# Patient Record
Sex: FEMALE | Race: BLACK OR AFRICAN AMERICAN | Employment: UNEMPLOYED | URBAN - NONMETROPOLITAN AREA
[De-identification: names, ages, dates, MRNs, and addresses within clinical notes are randomized per-mention and may not be internally consistent; named-entity substitution may affect disease eponyms.]

---
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
## 2024-01-10 ENCOUNTER — HOSPITAL ENCOUNTER (EMERGENCY)
Age: 19
Discharge: HOME | End: 2024-01-10
Payer: COMMERCIAL

## 2024-01-10 VITALS
RESPIRATION RATE: 16 BRPM | WEIGHT: 240 LBS | HEART RATE: 100 BPM | BODY MASS INDEX: 36.37 KG/M2 | DIASTOLIC BLOOD PRESSURE: 90 MMHG | OXYGEN SATURATION: 100 % | HEIGHT: 68 IN | TEMPERATURE: 99.5 F | SYSTOLIC BLOOD PRESSURE: 144 MMHG

## 2024-01-10 DIAGNOSIS — U07.1 COVID-19: Primary | ICD-10-CM

## 2024-01-10 LAB
FLUAV RNA RESP QL NAA+PROBE: NOT DETECTED
FLUBV RNA RESP QL NAA+PROBE: NOT DETECTED
SARS-COV-2 RNA RESP QL NAA+PROBE: DETECTED

## 2024-01-10 PROCEDURE — 99283 EMERGENCY DEPT VISIT LOW MDM: CPT

## 2024-01-10 PROCEDURE — 87636 SARSCOV2 & INF A&B AMP PRB: CPT

## 2024-01-11 NOTE — ED TRIAGE NOTES
Patient presents to ED with chief complaint of needing covid test. Patient states she took one at home and it was positive and she would like us to do one as well to make sure.

## 2024-01-11 NOTE — DISCHARGE INSTRUCTIONS
You need to increase the amount of fluids you are taking in to account for the viral illness.  The expected duration of illness is 7-10 days.  Tylenol 650 mg every 6 hours for fever and body aches.  Motrin 600 mg every six hours for fever and body aches.  You can use over the counter robitussin for the cough.  Follow up with your primary care provider if symptoms worsen over the next 3 to 5 days.     Recommend taking Vitamin C 500 mg twice daily, zinc  mg daily (for no more than one month), Vitamin D3 1000 units and slow release melatonin to help with the symptoms.     Check your pulse ox 4-6 times a day.  Return if less than 90%

## 2024-01-11 NOTE — ED PROVIDER NOTES
COVID-19 & INFLUENZA COMBO - Abnormal; Notable for the following components:       Result Value    SARS-CoV-2 RNA, RT PCR DETECTED (*)     All other components within normal limits       (Any cultures that may have been sent were not resulted at the time of this patient visit)    MEDICAL DECISION MAKING / ED COURSE:     Summary of Patient Presentation:      1) Number and Complexity of Problems                    Differential Diagnosis includes (but not limited to):  COVID, influenza A/B, strep pharyngitis, mononucleosis.                   Pertinent Comorbid Conditions:    Reviewed per the chart    2)  Data Reviewed (none if left blank, additional information can be found in the ED course)          My Independent interpretations:     EKG:           Imaging:      Labs:      Positive COVID                 Decision Rules/Clinical Scores utilized:                        Previous visit summary and patient history available on EMR and was reviewed.     History obtained from chart review and the patient.     Pertinent previous records reviewed:  previous notes, admissions and hospitalizations .    Code Status: Not addressed at time of initial evaluation and reviewed.             See Formal Diagnostic Results above for the lab and radiology tests and orders.         3)  Treatment and Disposition         ED Reassessment:            Case discussed with consulting clinician/attending physician:            Shared Decision-Making was performed and disposition discussed with the       Patient/Family and questions answered          Social determinants of health impacting treatment or disposition:                     Medical Decision Making    Vitals Reviewed:    Vitals:    01/10/24 1922   BP: (!) 144/90   Pulse: 100   Resp: 16   Temp: 99.5 °F (37.5 °C)   TempSrc: Oral   SpO2: 100%   Weight: 108.9 kg (240 lb)   Height: 1.727 m (5' 8\")       The patient was seen and examined. Appropriate diagnostic testing was performed and results

## 2024-01-27 ENCOUNTER — HOSPITAL ENCOUNTER (EMERGENCY)
Age: 19
Discharge: HOME | End: 2024-01-27
Attending: EMERGENCY MEDICINE
Payer: COMMERCIAL

## 2024-01-27 VITALS
HEART RATE: 97 BPM | SYSTOLIC BLOOD PRESSURE: 142 MMHG | RESPIRATION RATE: 16 BRPM | DIASTOLIC BLOOD PRESSURE: 86 MMHG | OXYGEN SATURATION: 99 % | TEMPERATURE: 98.7 F

## 2024-01-27 DIAGNOSIS — J02.9 VIRAL PHARYNGITIS: Primary | ICD-10-CM

## 2024-01-27 LAB
FLUAV RNA RESP QL NAA+PROBE: NOT DETECTED
FLUBV RNA RESP QL NAA+PROBE: NOT DETECTED
S PYO AG THROAT QL: NEGATIVE
S PYO THROAT QL CULT: NORMAL
SARS-COV-2 RNA RESP QL NAA+PROBE: NOT DETECTED

## 2024-01-27 PROCEDURE — 99283 EMERGENCY DEPT VISIT LOW MDM: CPT

## 2024-01-27 PROCEDURE — 94640 AIRWAY INHALATION TREATMENT: CPT

## 2024-01-27 PROCEDURE — 6370000000 HC RX 637 (ALT 250 FOR IP)

## 2024-01-27 PROCEDURE — 94761 N-INVAS EAR/PLS OXIMETRY MLT: CPT

## 2024-01-27 PROCEDURE — 87070 CULTURE OTHR SPECIMN AEROBIC: CPT

## 2024-01-27 PROCEDURE — 87636 SARSCOV2 & INF A&B AMP PRB: CPT

## 2024-01-27 PROCEDURE — 87880 STREP A ASSAY W/OPTIC: CPT

## 2024-01-27 RX ORDER — ALBUTEROL SULFATE 90 UG/1
2 AEROSOL, METERED RESPIRATORY (INHALATION) 4 TIMES DAILY PRN
Qty: 18 G | Refills: 0 | Status: SHIPPED | OUTPATIENT
Start: 2024-01-27

## 2024-01-27 RX ORDER — FLUTICASONE PROPIONATE 50 MCG
2 SPRAY, SUSPENSION (ML) NASAL DAILY
Qty: 16 G | Refills: 0 | Status: SHIPPED | OUTPATIENT
Start: 2024-01-27

## 2024-01-27 RX ORDER — IPRATROPIUM BROMIDE AND ALBUTEROL SULFATE 2.5; .5 MG/3ML; MG/3ML
1 SOLUTION RESPIRATORY (INHALATION) ONCE
Status: COMPLETED | OUTPATIENT
Start: 2024-01-27 | End: 2024-01-27

## 2024-01-27 RX ORDER — CETIRIZINE HYDROCHLORIDE 10 MG/1
10 TABLET ORAL DAILY
Qty: 30 TABLET | Refills: 0 | Status: SHIPPED | OUTPATIENT
Start: 2024-01-27 | End: 2024-02-26

## 2024-01-27 RX ADMIN — IPRATROPIUM BROMIDE AND ALBUTEROL SULFATE 1 DOSE: .5; 3 SOLUTION RESPIRATORY (INHALATION) at 10:38

## 2024-01-27 ASSESSMENT — PAIN - FUNCTIONAL ASSESSMENT: PAIN_FUNCTIONAL_ASSESSMENT: 0-10

## 2024-01-27 ASSESSMENT — PAIN SCALES - GENERAL: PAINLEVEL_OUTOF10: 5

## 2024-01-27 NOTE — ED PROVIDER NOTES
[facility]  EMERGENCY DEPARTMENT ENCOUNTER          Pt Name: Miguel Ram  MRN: [id number]  Birthdate 2005  Date of evaluation: 1/27/2024  ED Resident: Ki Love DO  ED Attending: Jaylon Ferreira MD    CHIEF COMPLAINT       Chief Complaint   Patient presents with    Pharyngitis     History obtained from the patient.    HISTORY OF PRESENT ILLNESS      Miguel Ram is a 18 y.o. female who presents to the emergency department for evaluation of sore throat for 1 day.  Of note patient tested positive for COVID-19 on 1/10/2024.    Patient states starting yesterday, she started to have increased cough and increased nasal drainage.  She states when she first tested positive for COVID-19 she did not have any symptoms.  She states it feels like there is something caught in the back of her throat and is making her want to cough.  Sometimes she feels like she is wheezing because of this.  She does not have a history of asthma, she does not smoke.  She denies any fevers or chills.  She denies any nausea or vomiting.  She denies being around any other new sick contacts.    Denies any other acute symptoms at this time.    PAST MEDICAL AND SURGICAL HISTORY   History reviewed. No pertinent past medical history.  Past Surgical History:   Procedure Laterality Date    TONSILLECTOMY         MEDICATIONS   No current facility-administered medications for this encounter.    Current Outpatient Medications:     albuterol sulfate HFA (VENTOLIN HFA) 108 (90 Base) MCG/ACT inhaler, Inhale 2 puffs into the lungs 4 times daily as needed for Wheezing, Disp: 18 g, Rfl: 0    fluticasone (FLONASE) 50 MCG/ACT nasal spray, 2 sprays by Each Nostril route daily, Disp: 16 g, Rfl: 0    cetirizine (ZYRTEC) 10 MG tablet, Take 1 tablet by mouth daily, Disp: 30 tablet, Rfl: 0    ondansetron (ZOFRAN-ODT) 4 MG disintegrating tablet, Take 1 tablet by mouth 3 times daily as needed for Nausea or Vomiting, Disp: 21  motion.   Skin:     General: Skin is warm.   Neurological:      General: No focal deficit present.      Mental Status: She is alert.   Psychiatric:         Mood and Affect: Mood normal.         DIFFERENTIALS   Initial Assessment: Given the patient's above chief complaint and findings on history and physical examination, I thought it was appropriate to consider the following emergency medical conditions:    Symptomatic COVID-19, influenza, viral URI, allergic rhinitis    PLAN:   DuoNeb nebulizer treatment given while in house.    Based on history of recent COVID-19 diagnosis, benign overall physical exam, no fevers, no chills, vital signs appropriate, unlikely that this is bacterial in nature.  Likely viral.    Although some of these diagnoses are unlikely they were considered in my medical decision making.  Chronic Conditions considered:   There is no problem list on file for this patient.      ED RESULTS   Laboratory results:  Labs Reviewed   COVID-19 & INFLUENZA COMBO   CULTURE, THROAT    Narrative:     Source: Specimen not received       Site:           Current Antibiotics:   GROUP A STREP, REFLEX       Radiologic studies results:  No orders to display       ED Medications administered this visit:   Medications   ipratropium 0.5 mg-albuterol 2.5 mg (DUONEB) nebulizer solution 1 Dose (1 Dose Inhalation Given 1/27/24 1038)       MEDICAL DECISION MAKING      Available laboratory and imaging results were independently reviewed and clinically correlated.    Decision Rules/Clinical Scores utilized:       Code Status:  Not addressed during this ED visit    Social determinants of health impacting treatment or disposition:  Not Applicable.    Medical Commodities impacting treatment or disposition:  Not Applicable. History reviewed. No pertinent past medical history.    Consultants: Not Applicable.    Final Assessment and Plan:   Likely remains symptomatic from COVID-19 positive diagnosis.  Continues to have nasal

## 2024-01-27 NOTE — ED PROVIDER NOTES
PATIENT NAME: Miguel Ram  MRN: [id number]  : 2005  [place]ANDS: 2024    I performed a history and physical examination of the patient and discussed management with the Resident. I reviewed the Resident's note and agree with the documented findings and plan of care. Any areas of disagreement are noted on the chart. I was personally present for the key portions of any procedures and have documented in the chart those procedures where I was not present during the key portions. I have reviewed the emergency nurses triage note and agree with the chief complaint, past medical history, past surgical history, allergies, medications, social and family history as documented unless otherwise noted below.    MEDICAL DEDISION MAKING (MDM)     Miguel Ram is a 18 y.o. female who presents to Emergency Department with Pharyngitis     Covid-19 Positive on 1/10/2024  Still having sore throat and cough.   ED workups are reassuring.  Discharged with Ventolin, Flonase, and Zyrtec.  PCP follow-up in a week.      Vitals:    24 0951 24 1038   BP: (!) 142/86    Pulse: 97    Resp: 16    Temp: 98.7 °F (37.1 °C)    TempSrc: Oral    SpO2: 99% 99%     Labs Reviewed   COVID-19 & INFLUENZA COMBO   CULTURE, THROAT    Narrative:     Source: Specimen not received       Site:           Current Antibiotics:   GROUP A STREP, REFLEX     No orders to display     Medications   ipratropium 0.5 mg-albuterol 2.5 mg (DUONEB) nebulizer solution 1 Dose (1 Dose Inhalation Given 24 1038)     FINAL IMPRESSION AND DISPOSITION      1. Viral pharyngitis        DISPOSITION Decision To Discharge 2024 11:12:13 AM      PATIENT REFERRED TO:  No follow-up provider specified.  DISCHARGE MEDICATIONS:  Discharge Medication List as of 2024 11:12 AM        START taking these medications    Details   albuterol sulfate HFA (VENTOLIN HFA) 108 (90 Base) MCG/ACT inhaler Inhale 2 puffs into the lungs 4 times daily as needed for Wheezing,

## 2024-01-27 NOTE — DISCHARGE INSTRUCTIONS
If symptoms were to worsen, including increasing shortness of breath, new onset chest pain, increasing fevers, would recommend patient return to the emergency department.

## 2024-01-27 NOTE — DISCHARGE INSTR - COC
Continuity of Care Form    Patient Name: Miguel Ram   :  2005  MRN:  [id number]    Admit date:  2024  Discharge date:  ***    Code Status Order: No Order   Advance Directives:     Admitting Physician:  No admitting provider for patient encounter.  PCP: No primary care provider on file.    Discharging Nurse: ***  Discharging Hospital Unit/Room#: D/D  Discharging Unit Phone Number: ***    Emergency Contact:   Extended Emergency Contact Information  Primary Emergency Contact: aMyela Alberto  Address: [address] [place] [id number] [facility]  Home Phone: [phone]  Mobile Phone: [phone]  Relation: Parent  Secondary Emergency Contact: Stan Alberto           [facility]  Home Phone: [phone]  Relation: Grandparent    Past Surgical History:  Past Surgical History:   Procedure Laterality Date    TONSILLECTOMY         Immunization History:     There is no immunization history on file for this patient.    Active Problems:  There is no problem list on file for this patient.      Isolation/Infection:   Isolation            No Isolation          Patient Infection Status       Infection Onset Added Last Indicated Last Indicated By Review Planned Expiration Resolved Resolved By    None active    Resolved    COVID-19 01/10/24 01/10/24 01/10/24 COVID-19 & Influenza Combo   24 Infection                        Nurse Assessment:  Last Vital Signs: BP (!) 142/86   Pulse 97   Temp 98.7 °F (37.1 °C) (Oral)   Resp 16   SpO2 99%     Last documented pain score (0-10 scale): Pain Level: 5  Last Weight:   Wt Readings from Last 1 Encounters:   01/10/24 108.9 kg (240 lb) (>99 %, Z= 2.40)*     * Growth percentiles are based on [facility] (Girls, 2-20 Years) data.     Mental Status:  {IP PT MENTAL STATUS:}    IV Access:  { [facility] IV ACCESS:[id number]}    Nursing Mobility/ADLs:  Walking   {[facility] ADLs:[id number]}  Transfer  {[facility]

## 2024-01-28 LAB — BACTERIA THROAT AEROBE CULT: NORMAL

## 2024-01-30 LAB — BACTERIA THROAT AEROBE CULT: NORMAL

## 2024-02-13 RX ORDER — ALBUTEROL SULFATE 90 UG/1
2 AEROSOL, METERED RESPIRATORY (INHALATION) 4 TIMES DAILY PRN
Qty: 18 EACH | OUTPATIENT
Start: 2024-02-13

## 2024-02-13 NOTE — TELEPHONE ENCOUNTER
This medication refill is regarding a electronic request. Refill requested by  [facility] .    Requested Prescriptions     Pending Prescriptions Disp Refills    albuterol sulfate HFA (PROVENTIL;VENTOLIN;PROAIR) 108 (90 Base) MCG/ACT inhaler [Pharmacy Med Name: ALBUTEROL HFA (VENTOLIN) INH] 18 each      Sig: INHALE 2 PUFFS INTO THE LUNGS 4 TIMES DAILY AS NEEDED FOR WHEEZING.       Date of last visit: None  Date of next visit: None  Date of last refill: 1/27/2024  18g/0    Rx verified, ordered and set to EP.

## 2024-02-19 RX ORDER — FLUTICASONE PROPIONATE 50 MCG
2 SPRAY, SUSPENSION (ML) NASAL DAILY
OUTPATIENT
Start: 2024-02-19

## 2024-02-19 NOTE — TELEPHONE ENCOUNTER
Attempted to contact pt to let her know she needs an appointment before Flonase can be refilled, due to it being prescribed while she was in the hospital, pt needs appointment with resident for any refills. Got busy tone, could not leave VM.

## 2024-02-19 NOTE — TELEPHONE ENCOUNTER
Patient's last appointment was : Visit date not found   Patient's next appointment is : Visit date not found   Last refilled on: 1-27-24  Which pharmacy does the script need sent to: [facility]      No results found for: \"LABA1C\"  No results found for: \"CHOL\", \"TRIG\", \"HDL\", \"LDLCALC\", \"LDLDIRECT\"  No results found for: \"NA\", \"K\", \"CL\", \"CO2\", \"BUN\", \"CREATININE\", \"GLUCOSE\", \"CALCIUM\", \"PROT\", \"LABALBU\", \"BILITOT\", \"ALKPHOS\", \"AST\", \"ALT\", \"LABGLOM\", \"GFRAA\", \"AGRATIO\", \"GLOB\"  No results found for: \"TSH\", \"C8IQEBT\", \"Z8ZKUNB\", \"THYROIDAB\", \"FT3\", \"T4FREE\"  No results found for: \"WBC\", \"HGB\", \"HCT\", \"MCV\", \"PLT\"  ]

## 2024-02-23 NOTE — TELEPHONE ENCOUNTER
This medication refill is regarding a electronic request. Refill requested by  [facility] .    Requested Prescriptions     Pending Prescriptions Disp Refills    cetirizine (ZYRTEC) 10 MG tablet [Pharmacy Med Name: CETIRIZINE HCL 10 MG TABLET] 30 tablet 0     Sig: TAKE 1 TABLET BY MOUTH EVERY DAY       Date of last visit: None  Date of next visit: None  Last Refill: 1/27/2024  30/0    \  Rx verified, ordered and set to EP.

## 2024-02-24 RX ORDER — CETIRIZINE HYDROCHLORIDE 10 MG/1
10 TABLET ORAL DAILY
Qty: 30 TABLET | Refills: 0 | OUTPATIENT
Start: 2024-02-24

## 2024-02-26 NOTE — TELEPHONE ENCOUNTER
Called to inform pt she needs to schedule to be seen in our office, Phone rings busy, unable to leave a VM